# Patient Record
Sex: MALE | Race: WHITE | URBAN - METROPOLITAN AREA
[De-identification: names, ages, dates, MRNs, and addresses within clinical notes are randomized per-mention and may not be internally consistent; named-entity substitution may affect disease eponyms.]

---

## 2018-12-15 ENCOUNTER — EMERGENCY (EMERGENCY)
Facility: HOSPITAL | Age: 63
LOS: 1 days | Discharge: HOME | End: 2018-12-15
Attending: EMERGENCY MEDICINE | Admitting: EMERGENCY MEDICINE
Payer: COMMERCIAL

## 2018-12-15 VITALS
TEMPERATURE: 99 F | OXYGEN SATURATION: 95 % | WEIGHT: 184.09 LBS | DIASTOLIC BLOOD PRESSURE: 93 MMHG | HEART RATE: 98 BPM | RESPIRATION RATE: 19 BRPM | SYSTOLIC BLOOD PRESSURE: 154 MMHG

## 2018-12-15 VITALS
DIASTOLIC BLOOD PRESSURE: 79 MMHG | HEART RATE: 89 BPM | TEMPERATURE: 99 F | SYSTOLIC BLOOD PRESSURE: 138 MMHG | RESPIRATION RATE: 16 BRPM | OXYGEN SATURATION: 96 %

## 2018-12-15 DIAGNOSIS — M79.18 MYALGIA, OTHER SITE: ICD-10-CM

## 2018-12-15 DIAGNOSIS — E78.5 HYPERLIPIDEMIA, UNSPECIFIED: ICD-10-CM

## 2018-12-15 DIAGNOSIS — I10 ESSENTIAL (PRIMARY) HYPERTENSION: ICD-10-CM

## 2018-12-15 DIAGNOSIS — B97.4 RESPIRATORY SYNCYTIAL VIRUS AS THE CAUSE OF DISEASES CLASSIFIED ELSEWHERE: ICD-10-CM

## 2018-12-15 DIAGNOSIS — R05 COUGH: ICD-10-CM

## 2018-12-15 LAB
RAPID RVP RESULT: DETECTED
RSV RNA SPEC QL NAA+PROBE: DETECTED

## 2018-12-15 PROCEDURE — 99284 EMERGENCY DEPT VISIT MOD MDM: CPT | Mod: 25

## 2018-12-15 PROCEDURE — 71046 X-RAY EXAM CHEST 2 VIEWS: CPT | Mod: 26

## 2018-12-15 PROCEDURE — 87486 CHLMYD PNEUM DNA AMP PROBE: CPT

## 2018-12-15 PROCEDURE — 87581 M.PNEUMON DNA AMP PROBE: CPT

## 2018-12-15 PROCEDURE — 87798 DETECT AGENT NOS DNA AMP: CPT

## 2018-12-15 PROCEDURE — 87633 RESP VIRUS 12-25 TARGETS: CPT

## 2018-12-15 PROCEDURE — 96374 THER/PROPH/DIAG INJ IV PUSH: CPT

## 2018-12-15 PROCEDURE — 71046 X-RAY EXAM CHEST 2 VIEWS: CPT

## 2018-12-15 RX ORDER — SODIUM CHLORIDE 9 MG/ML
1000 INJECTION INTRAMUSCULAR; INTRAVENOUS; SUBCUTANEOUS ONCE
Qty: 0 | Refills: 0 | Status: COMPLETED | OUTPATIENT
Start: 2018-12-15 | End: 2018-12-15

## 2018-12-15 RX ORDER — OXYCODONE AND ACETAMINOPHEN 5; 325 MG/1; MG/1
1 TABLET ORAL ONCE
Qty: 0 | Refills: 0 | Status: DISCONTINUED | OUTPATIENT
Start: 2018-12-15 | End: 2018-12-15

## 2018-12-15 RX ORDER — IBUPROFEN 200 MG
1 TABLET ORAL
Qty: 30 | Refills: 0 | OUTPATIENT
Start: 2018-12-15

## 2018-12-15 RX ORDER — KETOROLAC TROMETHAMINE 30 MG/ML
30 SYRINGE (ML) INJECTION ONCE
Qty: 0 | Refills: 0 | Status: DISCONTINUED | OUTPATIENT
Start: 2018-12-15 | End: 2018-12-15

## 2018-12-15 RX ADMIN — OXYCODONE AND ACETAMINOPHEN 1 TABLET(S): 5; 325 TABLET ORAL at 10:29

## 2018-12-15 RX ADMIN — Medication 30 MILLIGRAM(S): at 08:58

## 2018-12-15 RX ADMIN — SODIUM CHLORIDE 1 MILLILITER(S): 9 INJECTION INTRAMUSCULAR; INTRAVENOUS; SUBCUTANEOUS at 08:59

## 2018-12-15 NOTE — ED ADULT NURSE NOTE - OBJECTIVE STATEMENT
53 y/o male c/o " feeling sick for the past 3 days"  presenting with cough, fever, chills, decreased appetite" as per wife pt had a recent traveled to [place], was seen at urgent care yesterday and given  Zithromax with not relief.

## 2018-12-15 NOTE — ED ADULT TRIAGE NOTE - CHIEF COMPLAINT QUOTE
"I have been sick for over 3 days and was given Zithromax to started yesterday but this cough is horrible, I am not drinking fluids and my ears are clogged".

## 2018-12-15 NOTE — ED PROVIDER NOTE - MEDICAL DECISION MAKING DETAILS
64 y/o m URI sx x 2 days; temp 100.9 in ED, cxr negative, rvp is + for RSV.  Pt stable for d/c, recommend supportive care of rest, ibuprofen, oral hydration, f/u pmd.

## 2018-12-15 NOTE — ED PROVIDER NOTE - OBJECTIVE STATEMENT
62 y/o m hx HTN, HLD presents c/o cough, body aches, nasal congestion x 2 days.  Pt stating symptoms started while on flight home from [place], and his ears have both been bothering him after the flight, having pressure to both ears.  Pt went to [facility] yesterday and was started on zpack.  Denies n/v/d, CP, SOB, all other ROS negative.

## 2018-12-15 NOTE — ED PROVIDER NOTE - ENMT, MLM
Airway patent, pharynx mucosa pink, moist, no exudate, uvula midline.  TMs intact b/l with no erythema or bulging

## 2018-12-15 NOTE — ED ADULT NURSE NOTE - NSIMPLEMENTINTERV_GEN_ALL_ED
Implemented All Universal Safety Interventions:  [place] to call system. Call bell, personal items and telephone within reach. Instruct patient to call for assistance. Room bathroom lighting operational. Non-slip footwear when patient is off stretcher. Physically safe environment: no spills, clutter or unnecessary equipment. Stretcher in lowest position, wheels locked, appropriate side rails in place.

## 2018-12-15 NOTE — ED PROVIDER NOTE - ATTENDING CONTRIBUTION TO CARE
64 yo m w hx of HTN, HLD presents to ED with nasal congestion, cough, body aches s/p flight yesterday.  He is concerned he caught a respiratory infection.  On my face to face ED eval, patient is non toxic.  HRRR, lungs clear.  Abd soft.  TMs clear.  Will check labs, RVP, CXR and give toradol and IVF.  Anticipate discharge home with continued supportive care and PCP follow up in several days.